# Patient Record
Sex: MALE | Race: WHITE | Employment: FULL TIME | URBAN - METROPOLITAN AREA
[De-identification: names, ages, dates, MRNs, and addresses within clinical notes are randomized per-mention and may not be internally consistent; named-entity substitution may affect disease eponyms.]

---

## 2017-02-09 ENCOUNTER — OFFICE VISIT (OUTPATIENT)
Dept: NEUROLOGY | Facility: CLINIC | Age: 42
End: 2017-02-09

## 2017-02-09 VITALS
DIASTOLIC BLOOD PRESSURE: 82 MMHG | SYSTOLIC BLOOD PRESSURE: 128 MMHG | HEART RATE: 97 BPM | WEIGHT: 226 LBS | HEIGHT: 69 IN | TEMPERATURE: 98 F | OXYGEN SATURATION: 95 % | RESPIRATION RATE: 14 BRPM | BODY MASS INDEX: 33.47 KG/M2

## 2017-02-09 DIAGNOSIS — R51.9 CHRONIC DAILY HEADACHE: ICD-10-CM

## 2017-02-09 DIAGNOSIS — G43.009 MIGRAINE WITHOUT AURA AND WITHOUT STATUS MIGRAINOSUS, NOT INTRACTABLE: Primary | ICD-10-CM

## 2017-02-09 PROCEDURE — 99244 OFF/OP CNSLTJ NEW/EST MOD 40: CPT | Performed by: OTHER

## 2017-02-09 RX ORDER — SUMATRIPTAN 50 MG/1
50 TABLET, FILM COATED ORAL EVERY 2 HOUR PRN
Qty: 9 TABLET | Refills: 0 | Status: SHIPPED | OUTPATIENT
Start: 2017-02-09 | End: 2017-03-11

## 2017-02-09 RX ORDER — VERAPAMIL HYDROCHLORIDE 120 MG/1
120 CAPSULE, EXTENDED RELEASE ORAL NIGHTLY
Qty: 30 CAPSULE | Refills: 5 | Status: SHIPPED | OUTPATIENT
Start: 2017-02-09 | End: 2017-08-31

## 2017-02-09 NOTE — PROGRESS NOTES
HPI:    Patient ID: Lucia Montejo. is a 37year old male. Referring provider: [address] [place] [address]    HPI    Vaibhav Zhao is a 37year old with history of migraines who presented for evaluation of worsening headaches.  He states for past 2 years his heada and pain. Negative for visual disturbance. Respiratory: Negative. Cardiovascular: Negative. Gastrointestinal: Negative. Endocrine: Negative. Genitourinary: Negative. Musculoskeletal: Negative for neck pain and neck stiffness.    Neurologica Psychiatric:  normal mood and affect. Neurological  Mental status: Awake, alert and oriented to time, place and person. Speech is fluent with intact comprehension, repetition and naming. Memory is intact.   Cranial nerves:   II, III, IV, VI :Pupils rou Hr 30 capsule 5      Sig: Take 1 capsule (120 mg total) by mouth nightly. SUMAtriptan Succinate 50 MG Oral Tab 9 tablet 0      Sig: Take 1 tablet (50 mg total) by mouth every 2 (two) hours as needed for Migraine.  Use at onset; repeat once after 2 hour

## 2017-02-09 NOTE — PATIENT INSTRUCTIONS
1. Reduce to 25 mg x 1 week then discontinue    2. Verapamil 120 mg ER at night    3.  Continue Imitrex

## 2017-02-10 ENCOUNTER — TELEPHONE (OUTPATIENT)
Dept: NEUROLOGY | Facility: CLINIC | Age: 42
End: 2017-02-10

## 2017-02-10 DIAGNOSIS — G43.009 MIGRAINE WITHOUT AURA AND WITHOUT STATUS MIGRAINOSUS, NOT INTRACTABLE: Primary | ICD-10-CM

## 2017-02-10 NOTE — TELEPHONE ENCOUNTER
Authorization #[id number] for MRI Brain [id number] [facility] exp 3-11-17    Called patient gave him above information.  He will call to schedule

## 2017-02-10 NOTE — TELEPHONE ENCOUNTER
Spoke with patient. Advised patient to cut pill in half to take Topamax 25 mg. Patient states pill is not scored and he does not have a pill cutter. Spoke with pharmacist , Shawn Olivia @ [facility] who states pill can be cut in half with a pill cutter.  Brian ontiveros

## 2017-02-12 RX ORDER — TOPIRAMATE 25 MG/1
25 TABLET ORAL 2 TIMES DAILY
Qty: 60 TABLET | Refills: 0 | Status: SHIPPED | OUTPATIENT
Start: 2017-02-12 | End: 2017-03-30 | Stop reason: ALTCHOICE

## 2017-02-13 RX ORDER — TOPIRAMATE 25 MG/1
TABLET ORAL
Qty: 180 TABLET | Refills: 0 | OUTPATIENT
Start: 2017-02-13

## 2017-02-16 ENCOUNTER — HOSPITAL ENCOUNTER (OUTPATIENT)
Dept: MRI IMAGING | Age: 42
Discharge: HOME | End: 2017-02-16
Attending: Other
Payer: COMMERCIAL

## 2017-02-16 DIAGNOSIS — G43.009 MIGRAINE WITHOUT AURA AND WITHOUT STATUS MIGRAINOSUS, NOT INTRACTABLE: ICD-10-CM

## 2017-02-16 DIAGNOSIS — R51.9 CHRONIC DAILY HEADACHE: ICD-10-CM

## 2017-02-16 PROCEDURE — 70551 MRI BRAIN STEM W/O DYE: CPT

## 2017-02-20 ENCOUNTER — TELEPHONE (OUTPATIENT)
Dept: NEUROLOGY | Facility: CLINIC | Age: 42
End: 2017-02-20

## 2017-02-20 NOTE — TELEPHONE ENCOUNTER
LMKARLI. Patient was first seen by Dr. Gato Mayfield on 02/09/16 for migraines. He had an MRI for evaluation and has an upcoming appointment on 03/09/17. Please relay MRI results when the patient returns the phone call.

## 2017-02-23 NOTE — TELEPHONE ENCOUNTER
Left message on patient identified voicemail (ok with HIPPA consent) informing patient of MRI result.

## 2017-03-30 ENCOUNTER — OFFICE VISIT (OUTPATIENT)
Dept: NEUROLOGY | Facility: CLINIC | Age: 42
End: 2017-03-30

## 2017-03-30 VITALS
WEIGHT: 225 LBS | OXYGEN SATURATION: 97 % | DIASTOLIC BLOOD PRESSURE: 80 MMHG | BODY MASS INDEX: 33 KG/M2 | RESPIRATION RATE: 25 BRPM | TEMPERATURE: 99 F | HEART RATE: 100 BPM | SYSTOLIC BLOOD PRESSURE: 120 MMHG

## 2017-03-30 DIAGNOSIS — R51.9 CHRONIC DAILY HEADACHE: ICD-10-CM

## 2017-03-30 DIAGNOSIS — G43.709 CHRONIC MIGRAINE W/O AURA W/O STATUS MIGRAINOSUS, NOT INTRACTABLE: Primary | ICD-10-CM

## 2017-03-30 PROCEDURE — 99213 OFFICE O/P EST LOW 20 MIN: CPT | Performed by: OTHER

## 2017-03-30 RX ORDER — TOPIRAMATE 50 MG/1
50 TABLET, FILM COATED ORAL DAILY
Qty: 30 TABLET | Refills: 2 | Status: SHIPPED | OUTPATIENT
Start: 2017-03-30 | End: 2019-01-10

## 2017-04-03 NOTE — PROGRESS NOTES
HPI:    Patient ID: Misa Gurrola. is a 37year old male. HPI     Patient presented for follow up for migraine and chronic daily headaches.  States headache has improved in terms of frequency and severity with Verapamil however has noticed headac Oral Capsule SR 24 Hr Take 1 capsule (120 mg total) by mouth nightly. Disp: 30 capsule Rfl: 5   Diclofenac Sodium (VOLTAREN) 1 % Transdermal Gel Apply 4 g topically 4 (four) times daily.  Disp: 5 Tube Rfl: 1   HYDROcodone-acetaminophen (NORCO) 5-325 MG Oral headache we would get a MRA head. Take Tylenol ES 30 minutes prior to sexual activity. RTC in 2-3 months  See orders and medications filed with this encounter. The patient indicates understanding of these issues and agrees with the plan.       Tanesha Romero

## 2017-08-30 RX ORDER — VERAPAMIL HYDROCHLORIDE 120 MG/1
CAPSULE, EXTENDED RELEASE ORAL
Qty: 30 CAPSULE | Refills: 0 | Status: CANCELLED | OUTPATIENT
Start: 2017-08-30

## 2017-08-30 NOTE — TELEPHONE ENCOUNTER
Pt has appt scheduled tomorrow. Contacted patient, and he states he does not need this med today, and can wait for refill until tomorrow.     Medication: VERAPAMIL HCL  MG Oral Capsule SR 24 Hr    Date of last refill: 2/9/17 (#30/5)  Date last filled

## 2017-08-31 ENCOUNTER — OFFICE VISIT (OUTPATIENT)
Dept: NEUROLOGY | Facility: CLINIC | Age: 42
End: 2017-08-31

## 2017-08-31 VITALS
DIASTOLIC BLOOD PRESSURE: 80 MMHG | HEART RATE: 94 BPM | TEMPERATURE: 99 F | OXYGEN SATURATION: 98 % | HEIGHT: 69 IN | WEIGHT: 235.5 LBS | SYSTOLIC BLOOD PRESSURE: 118 MMHG | BODY MASS INDEX: 34.88 KG/M2 | RESPIRATION RATE: 16 BRPM

## 2017-08-31 DIAGNOSIS — G43.709 CHRONIC MIGRAINE W/O AURA W/O STATUS MIGRAINOSUS, NOT INTRACTABLE: Primary | ICD-10-CM

## 2017-08-31 DIAGNOSIS — R51.9 CHRONIC DAILY HEADACHE: ICD-10-CM

## 2017-08-31 PROCEDURE — 99213 OFFICE O/P EST LOW 20 MIN: CPT | Performed by: OTHER

## 2017-08-31 RX ORDER — VERAPAMIL HYDROCHLORIDE 120 MG/1
120 CAPSULE, EXTENDED RELEASE ORAL NIGHTLY
Qty: 30 CAPSULE | Refills: 5 | Status: SHIPPED | OUTPATIENT
Start: 2017-08-31 | End: 2018-02-26

## 2017-08-31 RX ORDER — SUMATRIPTAN 50 MG/1
50 TABLET, FILM COATED ORAL EVERY 2 HOUR PRN
Qty: 9 TABLET | Refills: 0 | Status: SHIPPED | OUTPATIENT
Start: 2017-08-31 | End: 2017-09-30

## 2017-08-31 NOTE — PROGRESS NOTES
HPI:    Patient ID: Nani Ramos. is a 40year old male. HPI       Patient presented for follow up for migraine and chronic daily headaches. States migraines have improved but continue to have intermittent mild headaches.  He no longer had any h negative. Current Outpatient Prescriptions:  SUMAtriptan Succinate 50 MG Oral Tab Take 1 tablet (50 mg total) by mouth every 2 (two) hours as needed for Migraine.  Use at onset; repeat once after 2 hours-ONLY 2 IN 24 HR MAX Disp: 9 tablet Rfl: 0 Finger-to-nose coordination is intact. Gait brisk and steady.           ASSESSMENT/PLAN:   (G43.709) Chronic migraine w/o aura w/o status migrainosus, not intractable  (primary encounter diagnosis)  (R51) Chronic daily headache    Plan: Likely getting some

## 2017-12-07 ENCOUNTER — OFFICE VISIT (OUTPATIENT)
Dept: NEUROLOGY | Facility: CLINIC | Age: 42
End: 2017-12-07

## 2017-12-07 VITALS
RESPIRATION RATE: 16 BRPM | SYSTOLIC BLOOD PRESSURE: 120 MMHG | OXYGEN SATURATION: 98 % | HEART RATE: 92 BPM | WEIGHT: 235 LBS | TEMPERATURE: 98 F | DIASTOLIC BLOOD PRESSURE: 70 MMHG | BODY MASS INDEX: 34.8 KG/M2 | HEIGHT: 69 IN

## 2017-12-07 DIAGNOSIS — G43.709 CHRONIC MIGRAINE W/O AURA W/O STATUS MIGRAINOSUS, NOT INTRACTABLE: Primary | ICD-10-CM

## 2017-12-07 DIAGNOSIS — G44.84 EXERTIONAL HEADACHE: ICD-10-CM

## 2017-12-07 PROCEDURE — 99213 OFFICE O/P EST LOW 20 MIN: CPT | Performed by: OTHER

## 2017-12-07 RX ORDER — RIZATRIPTAN BENZOATE 10 MG/1
10 TABLET, ORALLY DISINTEGRATING ORAL AS NEEDED
Qty: 10 TABLET | Refills: 2 | Status: SHIPPED | OUTPATIENT
Start: 2017-12-07 | End: 2019-01-10

## 2017-12-07 NOTE — PROGRESS NOTES
HPI:    Patient ID: Verner Salines. is a 40year old male. Migraine         Patient presented for follow up for migraine and exertional headaches. Migraines are stable.  He no longer had any headaches associated with sexual activity since we added mouth as needed for Migraine. Disp: 10 tablet Rfl: 2   Verapamil HCl  MG Oral Capsule SR 24 Hr Take 1 capsule (120 mg total) by mouth nightly. Disp: 30 capsule Rfl: 5   topiramate 50 MG Oral Tab Take 1 tablet (50 mg total) by mouth daily.  Disp: 30 ta Exertional headache    Stable overall. Try Maxalt instead of Imitrex and see if it is better tolerated. Continue Topamax and Verapamil at same dose.    May consider increasing dose of Verapamil if daily headache persist  Follow up in 5-6 months    See orde

## 2017-12-08 ENCOUNTER — TELEPHONE (OUTPATIENT)
Dept: NEUROLOGY | Facility: CLINIC | Age: 42
End: 2017-12-08

## 2017-12-08 DIAGNOSIS — G43.709 CHRONIC MIGRAINE W/O AURA W/O STATUS MIGRAINOSUS, NOT INTRACTABLE: Primary | ICD-10-CM

## 2017-12-11 NOTE — TELEPHONE ENCOUNTER
Rizatriptan ODT was ordered, and is not covered by insurance. [place] request was denied. Rizatriptan tabs are covered. Will check with provider to see if this can be ordered. Rx pended.

## 2017-12-13 RX ORDER — RIZATRIPTAN BENZOATE 10 MG/1
10 TABLET ORAL AS NEEDED
Qty: 12 TABLET | Refills: 2 | Status: SHIPPED | OUTPATIENT
Start: 2017-12-13 | End: 2018-01-12

## 2017-12-15 NOTE — TELEPHONE ENCOUNTER
Received fax from [employer]    This medication does not require authorization and medication quantities above the benefit limit are excluded under the plan.     This is not an denial patient is probably trying to fill too soon

## 2018-02-28 RX ORDER — VERAPAMIL HYDROCHLORIDE 120 MG/1
CAPSULE, EXTENDED RELEASE ORAL
Qty: 30 CAPSULE | Refills: 4 | Status: SHIPPED | OUTPATIENT
Start: 2018-02-28 | End: 2018-08-05

## 2018-08-05 DIAGNOSIS — G43.009 MIGRAINE WITHOUT AURA AND WITHOUT STATUS MIGRAINOSUS, NOT INTRACTABLE: Primary | ICD-10-CM

## 2018-08-09 RX ORDER — VERAPAMIL HYDROCHLORIDE 120 MG/1
CAPSULE, EXTENDED RELEASE ORAL
Qty: 30 CAPSULE | Refills: 2 | Status: SHIPPED | OUTPATIENT
Start: 2018-08-09 | End: 2018-12-04

## 2018-12-04 DIAGNOSIS — G43.009 MIGRAINE WITHOUT AURA AND WITHOUT STATUS MIGRAINOSUS, NOT INTRACTABLE: ICD-10-CM

## 2018-12-04 RX ORDER — VERAPAMIL HYDROCHLORIDE 120 MG/1
CAPSULE, EXTENDED RELEASE ORAL
Qty: 30 CAPSULE | Refills: 0 | Status: SHIPPED | OUTPATIENT
Start: 2018-12-04 | End: 2019-01-02

## 2019-01-02 DIAGNOSIS — G43.009 MIGRAINE WITHOUT AURA AND WITHOUT STATUS MIGRAINOSUS, NOT INTRACTABLE: ICD-10-CM

## 2019-01-02 RX ORDER — VERAPAMIL HYDROCHLORIDE 120 MG/1
CAPSULE, EXTENDED RELEASE ORAL
Qty: 30 CAPSULE | Refills: 0 | Status: SHIPPED | OUTPATIENT
Start: 2019-01-02 | End: 2019-01-10

## 2019-01-10 ENCOUNTER — OFFICE VISIT (OUTPATIENT)
Dept: NEUROLOGY | Facility: CLINIC | Age: 44
End: 2019-01-10
Payer: COMMERCIAL

## 2019-01-10 VITALS
OXYGEN SATURATION: 98 % | RESPIRATION RATE: 16 BRPM | SYSTOLIC BLOOD PRESSURE: 120 MMHG | HEIGHT: 69 IN | BODY MASS INDEX: 34.62 KG/M2 | DIASTOLIC BLOOD PRESSURE: 70 MMHG | WEIGHT: 233.75 LBS | HEART RATE: 98 BPM | TEMPERATURE: 98 F

## 2019-01-10 DIAGNOSIS — G43.009 MIGRAINE WITHOUT AURA AND WITHOUT STATUS MIGRAINOSUS, NOT INTRACTABLE: ICD-10-CM

## 2019-01-10 DIAGNOSIS — G43.709 CHRONIC MIGRAINE W/O AURA W/O STATUS MIGRAINOSUS, NOT INTRACTABLE: Primary | ICD-10-CM

## 2019-01-10 PROCEDURE — 99213 OFFICE O/P EST LOW 20 MIN: CPT | Performed by: OTHER

## 2019-01-10 RX ORDER — RIZATRIPTAN BENZOATE 10 MG/1
10 TABLET, ORALLY DISINTEGRATING ORAL AS NEEDED
Qty: 10 TABLET | Refills: 2 | Status: SHIPPED | OUTPATIENT
Start: 2019-01-10

## 2019-01-10 RX ORDER — VERAPAMIL HYDROCHLORIDE 120 MG/1
CAPSULE, EXTENDED RELEASE ORAL
Qty: 30 CAPSULE | Refills: 5 | Status: SHIPPED | OUTPATIENT
Start: 2019-01-10

## 2019-01-10 RX ORDER — TOPIRAMATE 50 MG/1
50 TABLET, FILM COATED ORAL DAILY
Qty: 30 TABLET | Refills: 5 | Status: SHIPPED | OUTPATIENT
Start: 2019-01-10

## 2019-01-10 NOTE — PROGRESS NOTES
HPI:    Patient ID: Tello Thomas. is a 44year old male. Migraine         Patient presented for follow up for headaches.   He no longer had any headaches associated with sexual activity since we added Topamax however continues to have migraine h HYDROcodone-acetaminophen (NORCO) 5-325 MG Oral Tab Take 1-2 tablets every day as needed for pain.  Disp: 60 tablet Rfl: 0   Pantoprazole Sodium (PROTONIX) 20 MG Oral Tab EC Take 20 mg by mouth every morning before breakfast. PRN  Disp:  Rfl:      Goyo Rang medications. See orders and medications filed with this encounter. The patient indicates understanding of these issues and agrees with the plan.           Yanelis Bai MD  [facility]        No orders of the defined types were plac

## 2019-02-08 DIAGNOSIS — G43.009 MIGRAINE WITHOUT AURA AND WITHOUT STATUS MIGRAINOSUS, NOT INTRACTABLE: ICD-10-CM

## 2019-02-10 RX ORDER — VERAPAMIL HYDROCHLORIDE 120 MG/1
CAPSULE, EXTENDED RELEASE ORAL
Qty: 30 CAPSULE | Refills: 0 | Status: SHIPPED | OUTPATIENT
Start: 2019-02-10 | End: 2019-11-27

## 2019-08-14 NOTE — TELEPHONE ENCOUNTER
Left message on patient identified voicemail (ok with HIPPA consent) informing patient  Needs appointment for further refills.   .  Medication: Topamax    Date of last refill: 1/10/19 (#30/5)  [place]    Last office vi

## 2019-08-27 NOTE — TELEPHONE ENCOUNTER
LM (2nd attempt) informing patient of need for appointment in order to continue refills. Office contact information provided.

## 2019-09-04 RX ORDER — TOPIRAMATE 50 MG/1
TABLET, FILM COATED ORAL
Qty: 30 TABLET | Refills: 0 | OUTPATIENT
Start: 2019-09-04

## 2019-11-27 PROBLEM — M95.8 OSTEOCHONDRAL DEFECT OF ANKLE: Status: ACTIVE | Noted: 2019-11-27

## 2019-11-27 PROBLEM — M24.00 JOINT LOOSE BODIES: Status: ACTIVE | Noted: 2019-11-27

## (undated) NOTE — MR AVS SNAPSHOT
[place]  [address] [address]  [address] [facility] [id number]  [phone]               Thank you for choosing us for your health care visit with Danielle De Guzman MD.  We are glad to serve you and happy to provide you with this Take 20 mg by mouth every morning before breakfast.   Commonly known as:  PROTONIX           topiramate 50 MG Tabs   Take 1 tablet (50 mg total) by mouth daily.    What changed:    - medication strength  - how much to take  - when to take this   Commonly kn

## (undated) NOTE — MR AVS SNAPSHOT
[place]  [address] [facility] [facility] [address]  [address] [facility] [id number] [phone]               Thank you for choosing us for your health care visit with Florentino Benjamin MD.  We are glad to serve you and happy to provide you with this Commonly known as:  NAPROSYN           Pantoprazole Sodium 20 MG Tbec   Take 20 mg by mouth every morning before breakfast.   Commonly known as:  PROTONIX           SUMAtriptan Succinate 50 MG Tabs   Take 1 tablet (50 mg total) by mouth every 2 (two) hours view more details from this visit by going to [email or web address]. If you've recently had a stay at the Hospital you can access your discharge instructions in [facility] by going to Visits < Admission Summaries.  If you've been to the Emergency Depar priority on exercise in your life                    Visit [facility] online at  [email or web address]